# Patient Record
Sex: MALE | Race: WHITE | NOT HISPANIC OR LATINO | Employment: FULL TIME | URBAN - METROPOLITAN AREA
[De-identification: names, ages, dates, MRNs, and addresses within clinical notes are randomized per-mention and may not be internally consistent; named-entity substitution may affect disease eponyms.]

---

## 2022-05-05 ENCOUNTER — MEDICAL CORRESPONDENCE (OUTPATIENT)
Dept: HEALTH INFORMATION MANAGEMENT | Facility: CLINIC | Age: 37
End: 2022-05-05
Payer: COMMERCIAL

## 2022-05-05 ENCOUNTER — TRANSFERRED RECORDS (OUTPATIENT)
Dept: HEALTH INFORMATION MANAGEMENT | Facility: CLINIC | Age: 37
End: 2022-05-05
Payer: COMMERCIAL

## 2022-05-16 ENCOUNTER — PATIENT OUTREACH (OUTPATIENT)
Dept: ONCOLOGY | Facility: CLINIC | Age: 37
End: 2022-05-16
Payer: COMMERCIAL

## 2022-05-16 ENCOUNTER — TRANSCRIBE ORDERS (OUTPATIENT)
Dept: OTHER | Age: 37
End: 2022-05-16
Payer: COMMERCIAL

## 2022-05-16 DIAGNOSIS — R89.9 ABNORMAL LABORATORY TEST: Primary | ICD-10-CM

## 2022-05-16 NOTE — PROGRESS NOTES
Writer received referral for Idiopathic Hypereosinophilic Syndrome, see labs in CE, referred by allergist. Reviewed for urgency based on labs and symptomology. Appropriate scheduling instructions added and referral sent to New Patient Scheduling for completion.

## 2022-05-17 ENCOUNTER — DOCUMENTATION ONLY (OUTPATIENT)
Dept: ONCOLOGY | Facility: CLINIC | Age: 37
End: 2022-05-17
Payer: COMMERCIAL

## 2022-05-17 NOTE — PROGRESS NOTES
Action May 17, 2022 8:56 AM ABT   Action Taken Records from [facility] received and sent to HIM for upload

## 2022-05-18 NOTE — PROGRESS NOTES
RECORDS STATUS - ALL OTHER DIAGNOSIS      Action    Action Taken 5/18/2022 10:07AM BHAVIN     I called [facility] Phone: [phone]- Amy had faxed records to HIM. HIM didn't upload the recs and I don't have access to Amy's email.  They will fax records over again.      RECORDS RECEIVED FROM: [facility]/ [facility]    DATE RECEIVED: 7/26/2022   NOTES STATUS DETAILS   OFFICE NOTE from referring provider     OFFICE NOTE from medical oncologist Complete  [facility] Records are in EPIC   DISCHARGE SUMMARY from hospital     DISCHARGE REPORT from the ER     OPERATIVE REPORT     CLINICAL TRIAL TREATMENTS TO DATE     LABS     PATHOLOGY REPORTS     ANYTHING RELATED TO DIAGNOSIS Complete Labs last updated on 4/26/2022 in CE   GENONOMIC TESTING     TYPE:     IMAGING (NEED IMAGES & REPORT)     CT SCANS     MRI     MAMMO     ULTRASOUND     PET

## 2022-07-26 ENCOUNTER — ONCOLOGY VISIT (OUTPATIENT)
Dept: ONCOLOGY | Facility: CLINIC | Age: 37
End: 2022-07-26
Attending: INTERNAL MEDICINE
Payer: COMMERCIAL

## 2022-07-26 ENCOUNTER — PRE VISIT (OUTPATIENT)
Dept: ONCOLOGY | Facility: CLINIC | Age: 37
End: 2022-07-26

## 2022-07-26 VITALS
BODY MASS INDEX: 29.43 KG/M2 | HEART RATE: 80 BPM | RESPIRATION RATE: 16 BRPM | HEIGHT: 68 IN | DIASTOLIC BLOOD PRESSURE: 93 MMHG | OXYGEN SATURATION: 97 % | SYSTOLIC BLOOD PRESSURE: 133 MMHG | WEIGHT: 194.2 LBS

## 2022-07-26 DIAGNOSIS — R71.8 ELEVATED RED BLOOD CELL COUNT: Primary | ICD-10-CM

## 2022-07-26 DIAGNOSIS — D72.110 HYPEREOSINOPHILIC SYNDROME, IDIOPATHIC: ICD-10-CM

## 2022-07-26 LAB
ALBUMIN SERPL-MCNC: 2.9 G/DL (ref 3.4–5)
ALP SERPL-CCNC: 77 U/L (ref 40–150)
ALT SERPL W P-5'-P-CCNC: 36 U/L (ref 0–70)
ANION GAP SERPL CALCULATED.3IONS-SCNC: 4 MMOL/L (ref 3–14)
AST SERPL W P-5'-P-CCNC: 20 U/L (ref 0–45)
BASOPHILS # BLD AUTO: 0.1 10E3/UL (ref 0–0.2)
BASOPHILS NFR BLD AUTO: 1 %
BILIRUB SERPL-MCNC: 0.3 MG/DL (ref 0.2–1.3)
BUN SERPL-MCNC: 23 MG/DL (ref 7–30)
CALCIUM SERPL-MCNC: 8.8 MG/DL (ref 8.5–10.1)
CHLORIDE BLD-SCNC: 102 MMOL/L (ref 94–109)
CO2 SERPL-SCNC: 31 MMOL/L (ref 20–32)
CREAT SERPL-MCNC: 0.95 MG/DL (ref 0.66–1.25)
EOSINOPHIL # BLD AUTO: 0.1 10E3/UL (ref 0–0.7)
EOSINOPHIL NFR BLD AUTO: 1 %
ERYTHROCYTE [DISTWIDTH] IN BLOOD BY AUTOMATED COUNT: 20.2 % (ref 10–15)
FERRITIN SERPL-MCNC: 16 NG/ML (ref 26–388)
GFR SERPL CREATININE-BSD FRML MDRD: >90 ML/MIN/1.73M2
GLUCOSE BLD-MCNC: 101 MG/DL (ref 70–99)
HCT VFR BLD AUTO: 52.3 % (ref 40–53)
HGB BLD-MCNC: 16.2 G/DL (ref 13.3–17.7)
IMM GRANULOCYTES # BLD: 0.1 10E3/UL
IMM GRANULOCYTES NFR BLD: 1 %
INTERPRETATION: NORMAL
IRON SATN MFR SERPL: 47 % (ref 15–46)
IRON SERPL-MCNC: 150 UG/DL (ref 35–180)
LDH SERPL L TO P-CCNC: 172 U/L (ref 85–227)
LYMPHOCYTES # BLD AUTO: 3.3 10E3/UL (ref 0.8–5.3)
LYMPHOCYTES NFR BLD AUTO: 28 %
MCH RBC QN AUTO: 23.9 PG (ref 26.5–33)
MCHC RBC AUTO-ENTMCNC: 31 G/DL (ref 31.5–36.5)
MCV RBC AUTO: 77 FL (ref 78–100)
MONOCYTES # BLD AUTO: 1.3 10E3/UL (ref 0–1.3)
MONOCYTES NFR BLD AUTO: 11 %
NEUTROPHILS # BLD AUTO: 7.1 10E3/UL (ref 1.6–8.3)
NEUTROPHILS NFR BLD AUTO: 58 %
NRBC # BLD AUTO: 0 10E3/UL
NRBC BLD AUTO-RTO: 0 /100
PLATELET # BLD AUTO: 360 10E3/UL (ref 150–450)
POTASSIUM BLD-SCNC: 4.3 MMOL/L (ref 3.4–5.3)
PROT SERPL-MCNC: 5.8 G/DL (ref 6.8–8.8)
RBC # BLD AUTO: 6.77 10E6/UL (ref 4.4–5.9)
RETICS # AUTO: 0.1 10E6/UL (ref 0.03–0.1)
RETICS/RBC NFR AUTO: 1.4 % (ref 0.5–2)
SIGNIFICANT RESULTS: NORMAL
SODIUM SERPL-SCNC: 137 MMOL/L (ref 133–144)
SPECIMEN DESCRIPTION: NORMAL
TEST DETAILS, MDL: NORMAL
TIBC SERPL-MCNC: 321 UG/DL (ref 240–430)
WBC # BLD AUTO: 12 10E3/UL (ref 4–11)

## 2022-07-26 PROCEDURE — 85004 AUTOMATED DIFF WBC COUNT: CPT | Performed by: INTERNAL MEDICINE

## 2022-07-26 PROCEDURE — 82310 ASSAY OF CALCIUM: CPT | Performed by: INTERNAL MEDICINE

## 2022-07-26 PROCEDURE — 85045 AUTOMATED RETICULOCYTE COUNT: CPT | Performed by: INTERNAL MEDICINE

## 2022-07-26 PROCEDURE — 84155 ASSAY OF PROTEIN SERUM: CPT | Performed by: INTERNAL MEDICINE

## 2022-07-26 PROCEDURE — 85060 BLOOD SMEAR INTERPRETATION: CPT | Performed by: PATHOLOGY

## 2022-07-26 PROCEDURE — 36415 COLL VENOUS BLD VENIPUNCTURE: CPT | Performed by: INTERNAL MEDICINE

## 2022-07-26 PROCEDURE — 99204 OFFICE O/P NEW MOD 45 MIN: CPT | Performed by: INTERNAL MEDICINE

## 2022-07-26 PROCEDURE — 83550 IRON BINDING TEST: CPT | Performed by: INTERNAL MEDICINE

## 2022-07-26 PROCEDURE — G0452 MOLECULAR PATHOLOGY INTERPR: HCPCS | Mod: 26 | Performed by: PATHOLOGY

## 2022-07-26 PROCEDURE — 81403 MOPATH PROCEDURE LEVEL 4: CPT | Performed by: INTERNAL MEDICINE

## 2022-07-26 PROCEDURE — 83615 LACTATE (LD) (LDH) ENZYME: CPT | Performed by: INTERNAL MEDICINE

## 2022-07-26 PROCEDURE — 82728 ASSAY OF FERRITIN: CPT | Performed by: INTERNAL MEDICINE

## 2022-07-26 RX ORDER — TESTOSTERONE CYPIONATE 200 MG/ML
INJECTION, SOLUTION INTRAMUSCULAR
COMMUNITY
Start: 2022-07-22

## 2022-07-26 RX ORDER — FOLIC ACID/MULTIVIT,IRON,MINER 0.4MG-18MG
2 TABLET ORAL
COMMUNITY

## 2022-07-26 RX ORDER — ALBUTEROL SULFATE 90 UG/1
AEROSOL, METERED RESPIRATORY (INHALATION)
COMMUNITY
Start: 2022-07-22

## 2022-07-26 RX ORDER — FLUTICASONE PROPIONATE 50 MCG
SPRAY, SUSPENSION (ML) NASAL
COMMUNITY
Start: 2022-07-22

## 2022-07-26 RX ORDER — ATORVASTATIN CALCIUM 10 MG/1
10 TABLET, FILM COATED ORAL EVERY OTHER DAY
COMMUNITY
Start: 2021-07-06

## 2022-07-26 RX ORDER — LORATADINE 10 MG/1
1 TABLET ORAL DAILY
COMMUNITY
Start: 2022-07-22

## 2022-07-26 ASSESSMENT — PAIN SCALES - GENERAL: PAINLEVEL: NO PAIN (0)

## 2022-07-26 NOTE — LETTER
7/26/2022         RE: Gianluca Lemons  [address]  [place] [address]        Dear Colleague,    Thank you for referring your patient, Gianluca Lemons, to the [facility] [place]. Please see a copy of my visit note below.    This consult has been requested by MANE Black from [facility].     Mr. 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  The patient mentioned that when he was in the [employer], he had exam and labs done.  At the age of 17, he had multiple investigations done at [facility] and also at [facility].  He was diagnosed with idiopathic hypereosinophilic syndrome.  His main symptoms at that time were chronic allergic rhinitis, asthma and eosinophilic gastroenteritis.  The patient says that he was seeing hematologist and allergist at that time.  His eosinophils were persistently high.  He had multiple investigations done, including blood work, scans and bone marrow biopsy.  He was diagnosed with idiopathic hypereosinophilic syndrome.     The patient was on prednisone for last 20 years. It was stopped 2 months ago and now he is on Nucala. Nucala is helping him.  His symptoms have almost resolved.  He feels the best he has felt in last 20 years.     The patient said that previously he was having lot of asthma symptoms.  He was also having lot of abdominal symptoms.  He was having bloating, nausea, vomiting, decreased appetite and abdominal discomfort.  Those have all improved.     The patient had labs done on 04/26/2022.  CBC revealed hemoglobin of 13.5, MCV of 71.5 and elevated erythrocyte of 6.38.  RDW elevated.  Platelet mildly elevated at 412.  Leukocyte mildly elevated at 12.1.     Because of this elevated RBC count, the patient has been sent to Hematology/Oncology clinic.       REVIEW OF SYSTEMS:  The patient feels good.  No headache.  No dizziness.  No chest pain.  No shortness of  breath.  No abdominal pain, nausea or vomiting.  Appetite is good.  No urinary or bowel complaints.  No abnormal bleeding.  No fever, chills or night sweats.     All other review of systems is negative.     ALLERGIES:  REVIEWED.     MEDICATIONS:  Reviewed.     PAST MEDICAL HISTORY:    1.  Idiopathic hypereosinophilic syndrome.  2.  Chronic allergic rhinitis.  3.  Asthma.  4.  Eosinophilic esophagitis.  5.  Eosinophilic gastroenteritis.  6.  Hyperlipidemia.  7.  Anxiety.  8.  Osteoporosis.  9.  Cholecystectomy.  10.  Hernia repair surgery.     SOCIAL HISTORY:    -No smoking.   -He is having occasional alcohol use.     PHYSICAL EXAMINATION:    GENERAL:  He is alert, oriented x 3.  Not in any distress.  VITAL SIGNS:  Reviewed.  Rest of systems not examined.     ASSESSMENT:    1.  A 37-year-old gentleman with idiopathic hypereosinophilic syndrome.  2.  Abnormal CBC with elevated RBC, low mean corpuscular volume and elevated RDW.  3.  Mild leukocytosis.  4.  Mild thrombocytosis.  5.  Other medical problems as described above.     RECOMMENDATIONS:    1.  The patient was diagnosed with idiopathic hypereosinophilic syndrome at the age of 17.  All his investigations were done in [facility] and [facility].  We will try to get records from there.  No need to repeat any investigations for that.     The patient is on Nucala.  That has been helping.  The patient said that it is working like a wonder.  He is pretty asymptomatic.  He will continue on his Nucala.    2.  Discussed regarding abnormal CBC. Hemoglobin and hematocrit is normal.  RBC is elevated.  RDW also elevated.     Different causes of elevated RBC count discussed.  I told him it could be simply reactive from iron deficiency anemia.  Other possibility is polycythemia vera.     The patient has mild leukocytosis and thrombocytosis.  This is likely reactive.  Discussed regarding further workup.  We will get some labs including CBC, CMP, retic,  peripheral blood smear review, iron studies, and ROYCE-2 mutation (if negative, MPL, and CALR).    3.  The patient had multiple questions, which were all answered.  I will see him in 2 weeks' time to review the labs.     Thanks for the consult.     Total visit time of 45 minutes.  Time was spent in today's visit, review of chart/investigations today and documentation today.    ADDENDUM: Labs done today (07/26/2022):  -WBC of 12, hemoglobin of 16.2 and platelet of 360. MCV low at 77. RBC elevated at 6.77. Neutrophil of 58%, lymphocyte of 28% and monocyte of 11%.  -Peripheral blood demonstrating mild erythrocytosis with microcytic/hypochromic red blood cells, mild leukocytosis.  -CMP normal except low protein and albumin.  -Ferritin low at 16. Normal iron of 150.  -Negative for ROYCE 2, MPL and CALR.    Will advise him to take OTC oral iron pill once a day.      CC:  MANE Black from [facility], fax number [phone].     Visit Date: 07/26/2022    This consult has been requested by MANE Black from [facility], fax number [phone].      SUBJECTIVE:  Mr. 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  The patient mentioned that he was in the [employer].  He had exam and labs done.  At the age of 17, he had multiple investigations done at [facility] and also at [facility].  He was diagnosed with idiopathic hypereosinophilic syndrome.  His main symptoms at that time were chronic allergic rhinitis, asthma and eosinophilic gastroenteritis.  The patient says that he was seeing hematologist/allergist at that time.  His eosinophils were persistently high.  He had multiple investigations done, including blood work, scans and bone marrow biopsy.  He was diagnosed with idiopathic hypereosinophilic syndrome.    The patient was on prednisone for last 20 years.  He has stopped it 2 months ago, as he is on  Sudhir.    Sudhir is helping him.  His symptoms have almost resolved.  He feels the best he has felt in last 20 years.    The patient said that previously was having lot of asthma symptoms.  He was also having lot of abdominal symptoms.  He was having bloating, nausea, vomiting, decreased appetite and abdominal discomfort.  Those have all improved.    The patient had labs done on 04/26/2002.  CBC revealed hemoglobin of 13.5, MCV of 71.5 and elevated erythrocyte of 6.38.  RDW elevated.  Platelet mildly elevated at 412.  Leukocyte mildly elevated at 12.1.    Because of this elevated RBC count, the patient has been sent to Hematology/Oncology clinic.      REVIEW OF SYSTEMS:  The patient feels good.  No headache.  No dizziness.  No chest pain.  No shortness of breath.  No abdominal pain, nausea, vomiting.  Appetite good.  No urinary or bowel complaints.  No abnormal bleeding.  No fever, chills, or night sweats.    All other review of systems is negative.    ALLERGIES:  REVIEWED.    MEDICATIONS:  Reviewed.    PAST MEDICAL HISTORY:    1.  Idiopathic hypereosinophilic syndrome.  2.  Chronic allergic rhinitis.  3.  Asthma.  4.  Eosinophilic esophagitis.  5.  Eosinophilic gastroenteritis.  6.  Hyperlipidemia.  7.  Anxiety.  8.  Osteoporosis.  9.  Cholecystectomy.  10.  Hernia repair surgery.    SOCIAL HISTORY:    -- No smoking.   --He is having occasional alcohol use.    PHYSICAL EXAMINATION:    GENERAL:  He is alert, oriented x 3.  Not in any distress.  VITAL SIGNS:  Reviewed.  Rest of systems not examined.    ASSESSMENT:    1.  A 37-year-old gentleman with idiopathic hypereosinophilic syndrome.  2.  Abnormal CBC with elevated RBC, low mean corpuscular volume and elevated RDW.  3.  Mild leukocytosis.  4.  Mild thrombocytosis.  5.  Other medical problems as described above.    RECOMMENDATIONS:    1.  The patient was diagnosed with idiopathic hypereosinophilic syndrome at the age of 17.  All his investigations were done in  [facility].  We will try to get records from there.  No need to repeat any investigations for that.    The patient is on Nucala.  That has been helping.  The patient said that it is working like a wonder.  He is pretty asymptomatic.  He will continue on his Nucala.  2.  Discussed regarding abnormal CBC.    Hemoglobin and hematocrit is normal.  RBC is elevated.  RDW also elevated.    Different causes of elevated RBC count discussed.  I told him it could be simply reactive from iron deficiency anemia.  Other possibility is polycythemia vera.    The patient has mild leukocytosis and thrombocytosis.  This is likely reactive.  Discussed regarding further workup.  We will get some labs including CBC, CMP, retic, peripheral blood smear review, iron studies, and ROYCE-2 mutation (if negative, MPL, and CALR).  3.  The patient had multiple questions, which were all answered.  I will see him in 2 weeks' time to review the labs.    Thanks for the consult.    Total visit time of 45 minutes.  Time was spent in today's visit, review of chart/investigations today and documentation today.    Janis Azevedo MD        D: 2022   T: 2022   MT: [facility]    Name:     RENE STEVENS  MRN:      [id number]        Account:    [id number]   :      1985           Visit Date: 2022     Document: [id number]    cc:  JOSE MARIA HOLLEY RN, NP-C       Again, thank you for allowing me to participate in the care of your patient.        Sincerely,        Janis Azevedo MD

## 2022-07-26 NOTE — PATIENT INSTRUCTIONS
Labs today. sls  Get medical records from [facility] ([facility] also [facility]). Faxed THANH  Virtual visit in 2 weeks.    PLEASE CALL TO SCHEDULE

## 2022-07-27 LAB
PATH REPORT.COMMENTS IMP SPEC: NORMAL
PATH REPORT.COMMENTS IMP SPEC: NORMAL
PATH REPORT.FINAL DX SPEC: NORMAL
PATH REPORT.MICROSCOPIC SPEC OTHER STN: NORMAL
PATH REPORT.MICROSCOPIC SPEC OTHER STN: NORMAL

## 2022-07-27 NOTE — PROGRESS NOTES
Visit Date: 07/26/2022    This consult has been requested by MANE Black from [facility].     Mr. 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  The patient mentioned that when he was in the [employer], he had exam and labs done.  At the age of 17, he had multiple investigations done at [facility] and also at [facility].  He was diagnosed with idiopathic hypereosinophilic syndrome.  His main symptoms at that time were chronic allergic rhinitis, asthma and eosinophilic gastroenteritis.  The patient says that he was seeing hematologist and allergist at that time.  His eosinophils were persistently high.  He had multiple investigations done, including blood work, scans and bone marrow biopsy.  He was diagnosed with idiopathic hypereosinophilic syndrome.     The patient was on prednisone for last 20 years. It was stopped 2 months ago and now he is on Nucala. Nucala is helping him.  His symptoms have almost resolved.  He feels the best he has felt in last 20 years.     The patient said that previously he was having lot of asthma symptoms.  He was also having lot of abdominal symptoms.  He was having bloating, nausea, vomiting, decreased appetite and abdominal discomfort.  Those have all improved.     The patient had labs done on 04/26/2022.  CBC revealed hemoglobin of 13.5, MCV of 71.5 and elevated erythrocyte of 6.38.  RDW elevated.  Platelet mildly elevated at 412.  Leukocyte mildly elevated at 12.1.     Because of this elevated RBC count, the patient has been sent to Hematology/Oncology clinic.       REVIEW OF SYSTEMS:  The patient feels good.  No headache.  No dizziness.  No chest pain.  No shortness of breath.  No abdominal pain, nausea or vomiting.  Appetite is good.  No urinary or bowel complaints.  No abnormal bleeding.  No fever, chills or night sweats.     All other review of systems is negative.     ALLERGIES:  REVIEWED.      MEDICATIONS:  Reviewed.     PAST MEDICAL HISTORY:    1.  Idiopathic hypereosinophilic syndrome.  2.  Chronic allergic rhinitis.  3.  Asthma.  4.  Eosinophilic esophagitis.  5.  Eosinophilic gastroenteritis.  6.  Hyperlipidemia.  7.  Anxiety.  8.  Osteoporosis.  9.  Cholecystectomy.  10.  Hernia repair surgery.     SOCIAL HISTORY:    -No smoking.   -He is having occasional alcohol use.     PHYSICAL EXAMINATION:    GENERAL:  He is alert, oriented x 3.  Not in any distress.  VITAL SIGNS:  Reviewed.  Rest of systems not examined.     ASSESSMENT:    1.  A 37-year-old gentleman with idiopathic hypereosinophilic syndrome.  2.  Abnormal CBC with elevated RBC, low mean corpuscular volume and elevated RDW.  3.  Mild leukocytosis.  4.  Mild thrombocytosis.  5.  Other medical problems as described above.     RECOMMENDATIONS:    1.  The patient was diagnosed with idiopathic hypereosinophilic syndrome at the age of 17.  All his investigations were done in [facility] and [facility].  We will try to get records from there.  No need to repeat any investigations for that.     The patient is on Nucala.  That has been helping.  The patient said that it is working like a wonder.  He is pretty asymptomatic.  He will continue on his Nucala.    2.  Discussed regarding abnormal CBC. Hemoglobin and hematocrit is normal.  RBC is elevated.  RDW also elevated.     Different causes of elevated RBC count discussed.  I told him it could be simply reactive from iron deficiency anemia.  Other possibility is polycythemia vera.     The patient has mild leukocytosis and thrombocytosis.  This is likely reactive.  Discussed regarding further workup.  We will get some labs including CBC, CMP, retic, peripheral blood smear review, iron studies, and ROYCE-2 mutation (if negative, MPL, and CALR).    3.  The patient had multiple questions, which were all answered.  I will see him in 2 weeks' time to review the labs.     Thanks for  the consult.     Total visit time of 45 minutes.  Time was spent in today's visit, review of chart/investigations today and documentation today.    ADDENDUM: Labs done today (2022):  -WBC of 12, hemoglobin of 16.2 and platelet of 360. MCV low at 77. RBC elevated at 6.77. Neutrophil of 58%, lymphocyte of 28% and monocyte of 11%.  -Peripheral blood demonstrating mild erythrocytosis with microcytic/hypochromic red blood cells, mild leukocytosis.  -CMP normal except low protein and albumin.  -Ferritin low at 16. Normal iron of 150.  -Negative for ROYCE 2, MPL and CALR.    Will advise him to take OTC oral iron pill once a day.    Janis Azevedo MD    D: 2022   T: 2022   MT: MARIE    Name:     RENE STEVENS  MRN:      [id number]        Account:    [id number]   :      1985           Visit Date: 2022     Document: [id number]    cc:  JOSE MARIA HOLLEY RN, NP-C from [facility], fax number [phone].

## 2022-08-01 LAB
INTERPRETATION: NORMAL
SIGNIFICANT RESULTS: NORMAL
SPECIMEN DESCRIPTION: NORMAL
TEST DETAILS, MDL: NORMAL

## 2022-08-07 NOTE — PROGRESS NOTES
This consult has been requested by MANE Black from [facility].     Mr. 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  The patient mentioned that when he was in the [employer], he had exam and labs done.  At the age of 17, he had multiple investigations done at [facility] and also at [facility].  He was diagnosed with idiopathic hypereosinophilic syndrome.  His main symptoms at that time were chronic allergic rhinitis, asthma and eosinophilic gastroenteritis.  The patient says that he was seeing hematologist and allergist at that time.  His eosinophils were persistently high.  He had multiple investigations done, including blood work, scans and bone marrow biopsy.  He was diagnosed with idiopathic hypereosinophilic syndrome.     The patient was on prednisone for last 20 years. It was stopped 2 months ago and now he is on Nucala. Nucala is helping him.  His symptoms have almost resolved.  He feels the best he has felt in last 20 years.     The patient said that previously he was having lot of asthma symptoms.  He was also having lot of abdominal symptoms.  He was having bloating, nausea, vomiting, decreased appetite and abdominal discomfort.  Those have all improved.     The patient had labs done on 04/26/2022.  CBC revealed hemoglobin of 13.5, MCV of 71.5 and elevated erythrocyte of 6.38.  RDW elevated.  Platelet mildly elevated at 412.  Leukocyte mildly elevated at 12.1.     Because of this elevated RBC count, the patient has been sent to Hematology/Oncology clinic.       REVIEW OF SYSTEMS:  The patient feels good.  No headache.  No dizziness.  No chest pain.  No shortness of breath.  No abdominal pain, nausea or vomiting.  Appetite is good.  No urinary or bowel complaints.  No abnormal bleeding.  No fever, chills or night sweats.     All other review of systems is negative.     ALLERGIES:  REVIEWED.     MEDICATIONS:   Reviewed.     PAST MEDICAL HISTORY:    1.  Idiopathic hypereosinophilic syndrome.  2.  Chronic allergic rhinitis.  3.  Asthma.  4.  Eosinophilic esophagitis.  5.  Eosinophilic gastroenteritis.  6.  Hyperlipidemia.  7.  Anxiety.  8.  Osteoporosis.  9.  Cholecystectomy.  10.  Hernia repair surgery.     SOCIAL HISTORY:    -No smoking.   -He is having occasional alcohol use.     PHYSICAL EXAMINATION:    GENERAL:  He is alert, oriented x 3.  Not in any distress.  VITAL SIGNS:  Reviewed.  Rest of systems not examined.     ASSESSMENT:    1.  A 37-year-old gentleman with idiopathic hypereosinophilic syndrome.  2.  Abnormal CBC with elevated RBC, low mean corpuscular volume and elevated RDW.  3.  Mild leukocytosis.  4.  Mild thrombocytosis.  5.  Other medical problems as described above.     RECOMMENDATIONS:    1.  The patient was diagnosed with idiopathic hypereosinophilic syndrome at the age of 17.  All his investigations were done in [facility] and [facility].  We will try to get records from there.  No need to repeat any investigations for that.     The patient is on Nucala.  That has been helping.  The patient said that it is working like a wonder.  He is pretty asymptomatic.  He will continue on his Nucala.    2.  Discussed regarding abnormal CBC. Hemoglobin and hematocrit is normal.  RBC is elevated.  RDW also elevated.     Different causes of elevated RBC count discussed.  I told him it could be simply reactive from iron deficiency anemia.  Other possibility is polycythemia vera.     The patient has mild leukocytosis and thrombocytosis.  This is likely reactive.  Discussed regarding further workup.  We will get some labs including CBC, CMP, retic, peripheral blood smear review, iron studies, and ROYCE-2 mutation (if negative, MPL, and CALR).    3.  The patient had multiple questions, which were all answered.  I will see him in 2 weeks' time to review the labs.     Thanks for the  consult.     Total visit time of 45 minutes.  Time was spent in today's visit, review of chart/investigations today and documentation today.    ADDENDUM: Labs done today (07/26/2022):  -WBC of 12, hemoglobin of 16.2 and platelet of 360. MCV low at 77. RBC elevated at 6.77. Neutrophil of 58%, lymphocyte of 28% and monocyte of 11%.  -Peripheral blood demonstrating mild erythrocytosis with microcytic/hypochromic red blood cells, mild leukocytosis.  -CMP normal except low protein and albumin.  -Ferritin low at 16. Normal iron of 150.  -Negative for ROYCE 2, MPL and CALR.    Will advise him to take OTC oral iron pill once a day.      CC:  MANE Black from [facility], fax number [phone].

## 2022-08-22 ENCOUNTER — VIRTUAL VISIT (OUTPATIENT)
Dept: ONCOLOGY | Facility: CLINIC | Age: 37
End: 2022-08-22
Attending: INTERNAL MEDICINE
Payer: COMMERCIAL

## 2022-08-22 DIAGNOSIS — D72.110 HYPEREOSINOPHILIC SYNDROME, IDIOPATHIC: Primary | ICD-10-CM

## 2022-08-22 PROCEDURE — 99213 OFFICE O/P EST LOW 20 MIN: CPT | Mod: 95 | Performed by: INTERNAL MEDICINE

## 2022-08-22 NOTE — NURSING NOTE
Patient declined individual allergy and medication review by support staff because of time constraints (pt had to run kids to school) and patient stated the meds were the same as visit in July.     PO

## 2022-08-22 NOTE — PROGRESS NOTES
Gianluca is a 37 year old who is being evaluated via a billable video visit.      Nataliia Schroeder VF

## 2022-08-22 NOTE — LETTER
8/22/2022         RE: Gianluca Lemons  [address]  [place] [address]        Dear Colleague,    Thank you for referring your patient, Gianluca Lemons, to the [facility] [place]. Please see a copy of my visit note below.    Gianluca is a 37 year old who is being evaluated via a billable video visit.      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    1. Patient mentioned that when he was in the [employer], he had exam and labs done.  At the age of 17, he had multiple investigations done at [facility] and also at [facility].  He was diagnosed with idiopathic hypereosinophilic syndrome.  His main symptoms at that time were chronic allergic rhinitis, asthma and eosinophilic gastroenteritis.  The patient says that he was seeing hematologist and allergist at that time.  His eosinophils were persistently high.  He had multiple investigations done, including blood work, scans and bone marrow biopsy.  He was diagnosed with idiopathic hypereosinophilic syndrome.  -Patient was started on prednisone at the age of 17. He took it for 20 years. It was stopped in May,2022.  -He was started on Nucala in 2022.  2. On 07/26/2022:  -WBC of 12, Hgb of 16.2 and platelet of 360. Neutrophil of 58%, lymphocyte of 28%, monocyte of 11%, eosinophil of 1% and basophil of 1%.  3. CT abdomen and pelvis on 08/02/2022 done at [facility] revealed nonspecific enteritis. No lymphadenopathy or splenomegaly.    SUBJECTIVE:  Mr. 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  Because of that, he has multiple medical problems including chronic allergic rhinitis, asthma, eosinophilic esophagitis and eosinophilic gastroenteritis.  The patient is currently on Nucala; that has been helping.     Lately, he has been having some abdominal symptoms.  Some bloating.  Some nausea.  Some diarrhea.  CBC has been  done.  His neutrophil counts are normal.  He is going to see a gastroenterologist to make sure he does not have irritable bowel syndrome.     No headache or dizziness.  No chest pain or shortness of breath.  No bleeding.  No fever or chills.     All other review of systems is negative.     PHYSICAL EXAMINATION:    GENERAL:  He is alert and oriented x 3.  Not in distress.   Rest of systems not examined as this is a video visit.     LABORATORY:  CBC, CMP, LDH, and iron studies done on 07/26/2022 reviewed.  -CT abdomen and pelvis on 08/02/2022 done at [facility] revealed nonspecific enteritis.     ASSESSMENT:    1.  A 37-year-old gentleman with idiopathic hypereosinophilic syndrome.  2.  Mild leukocytosis  3.  Low MCV and ferritin.  No anemia.  4.  Abdominal symptoms.     PLAN:    1.  Recent labs and CT scan done at outside hospital reviewed with him.  CBC does not reveal any eosinophilia.  His idiopathic hypereosinophilic syndrome is under control.    2.  He is on Nucala, which he will continue.  3.  The patient has some abdominal symptoms.  He is going to see GI.  He used to be on prednisone for many years.  That was just stopped.  Some of his abdominal symptoms could be because of that.  GI will decide regarding resuming any prednisone.  4.  Discussed regarding followup.  I will see him in 1 year's time.  I advised him to call us with any questions or concerns.     TOTAL VIDEO VISIT TIME:  Twenty minutes.  Time spent in today's visit, review of chart/investigations today, and documentation today.    Visit Date: 08/22/2022    SUBJECTIVE:  Mr. 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  Because of that, he has multiple medical problems including chronic allergic rhinitis, asthma, eosinophilic esophagitis, and eosinophilic gastroenteritis.  The patient is currently on Nucala; that has been helping.    Lately, he has been having some abdominal symptoms.  Some bloating.  Some  nausea.  Some diarrhea.  CBC has been done.  His neutrophil counts are normal.  He is going to see a gastroenterologist to make sure he does not have irritable bowel syndrome.    No headache or dizziness.  No chest pain or shortness of breath.  No bleeding.  No fever or chills.    All other review of systems is negative.    PHYSICAL EXAMINATION:    GENERAL:  He is alert and oriented x 3.  Not in distress.   Rest of systems not examined, as this is a video visit.    LABORATORY:  CBC, CMP, LDH, and iron studies done on 2022 reviewed.  -- CT abdomen and pelvis on 2022 done at [facility] revealed nonspecific enteritis.    ASSESSMENT:    1.  A 37-year-old gentleman with idiopathic hypereosinophilic syndrome.  2.  Mild leukocytosis  3.  Low MCV and ferritin.  No anemia.  4.  Abdominal symptoms.    PLAN:    1.  Recent labs and CT scan done at outside hospital reviewed with him.  CBC does not reveal any eosinophilia.  His idiopathic hypereosinophilic syndrome is under control.    2.  He is on Nucala, which he will continue.  3.  The patient has some abdominal symptoms.  He is going to see GI.  He used to be on prednisone for many years.  That was just started.  Some of his abdominal symptoms could be because of that.  GI will decide regarding resuming any prednisone.  4.  Discussed regarding followup.  I will see him in 1 year's time.  I advised him to call us with any questions or concerns.    TOTAL VIDEO VISIT TIME:  Twenty minutes.  Time spent in today's visit, review of chart/investigations today, and documentation today.    Janis Azevedo MD        D: 2022   T: 2022   MT: LENIN    Name:     RENE STEVENS  MRN:      [id number]        Account:    [id number]   :      1985           Visit Date: 2022     Document: [id number]      Again, thank you for allowing me to participate in the care of your patient.        Sincerely,        Janis Azevedo MD

## 2022-09-03 NOTE — PROGRESS NOTES
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    1. Patient mentioned that when he was in the [employer], he had exam and labs done.  At the age of 17, he had multiple investigations done at [facility] and also at [facility].  He was diagnosed with idiopathic hypereosinophilic syndrome.  His main symptoms at that time were chronic allergic rhinitis, asthma and eosinophilic gastroenteritis.  The patient says that he was seeing hematologist and allergist at that time.  His eosinophils were persistently high.  He had multiple investigations done, including blood work, scans and bone marrow biopsy.  He was diagnosed with idiopathic hypereosinophilic syndrome.  -Patient was started on prednisone at the age of 17. He took it for 20 years. It was stopped in May,2022.  -He was started on Nucala in 2022.  2. On 07/26/2022:  -WBC of 12, Hgb of 16.2 and platelet of 360. Neutrophil of 58%, lymphocyte of 28%, monocyte of 11%, eosinophil of 1% and basophil of 1%.  3. CT abdomen and pelvis on 08/02/2022 done at [facility] revealed nonspecific enteritis. No lymphadenopathy or splenomegaly.    SUBJECTIVE:  Mr. 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  Because of that, he has multiple medical problems including chronic allergic rhinitis, asthma, eosinophilic esophagitis and eosinophilic gastroenteritis.  The patient is currently on Nucala; that has been helping.     Lately, he has been having some abdominal symptoms.  Some bloating.  Some nausea.  Some diarrhea.  CBC has been done.  His neutrophil counts are normal.  He is going to see a gastroenterologist to make sure he does not have irritable bowel syndrome.     No headache or dizziness.  No chest pain or shortness of breath.  No bleeding.  No fever or chills.     All other review of systems is negative.     PHYSICAL EXAMINATION:    GENERAL:  He is alert and oriented x 3.   Not in distress.   Rest of systems not examined as this is a video visit.     LABORATORY:  CBC, CMP, LDH, and iron studies done on 07/26/2022 reviewed.  -CT abdomen and pelvis on 08/02/2022 done at [facility] revealed nonspecific enteritis.     ASSESSMENT:    1.  A 37-year-old gentleman with idiopathic hypereosinophilic syndrome.  2.  Mild leukocytosis  3.  Low MCV and ferritin.  No anemia.  4.  Abdominal symptoms.     PLAN:    1.  Recent labs and CT scan done at outside hospital reviewed with him.  CBC does not reveal any eosinophilia.  His idiopathic hypereosinophilic syndrome is under control.    2.  He is on Nucala, which he will continue.  3.  The patient has some abdominal symptoms.  He is going to see GI.  He used to be on prednisone for many years.  That was just stopped.  Some of his abdominal symptoms could be because of that.  GI will decide regarding resuming any prednisone.  4.  Discussed regarding followup.  I will see him in 1 year's time.  I advised him to call us with any questions or concerns.     TOTAL VIDEO VISIT TIME:  Twenty minutes.  Time spent in today's visit, review of chart/investigations today, and documentation today.

## 2024-04-15 ENCOUNTER — TRANSCRIBE ORDERS (OUTPATIENT)
Dept: OTHER | Age: 39
End: 2024-04-15

## 2024-04-15 DIAGNOSIS — K52.81 EOSINOPHILIC GASTROENTERITIS AND COLITIS: ICD-10-CM

## 2024-04-15 DIAGNOSIS — D72.110 IDIOPATHIC HYPEREOSINOPHILIC SYNDROME: ICD-10-CM

## 2024-04-15 DIAGNOSIS — K52.82 EOSINOPHILIC GASTROENTERITIS AND COLITIS: ICD-10-CM

## 2024-04-15 DIAGNOSIS — R74.8 ELEVATED LIVER ENZYMES: Primary | ICD-10-CM
